# Patient Record
(demographics unavailable — no encounter records)

---

## 2025-02-16 NOTE — HISTORY OF PRESENT ILLNESS
[FreeTextEntry1] : Mr. Tineo is a 71 y.o M dx with prostate adenocarcinoma. Recent PSA on 10/21/24 is 4.30ng/ml; on 2/29/24 it was 10.90 ng/ml. MRI on 3/18/24 showed a prostate volume of 124cc and prostate measured 6.3cm x 5 cm x 7.6 cm. Fusion guided prostate biopsies on 11/4/24 revealed 2 out of 12 cores involved by adenocarcinoma Ranjan score 3+3=6 GG1 involving 33% of tissue with carcinoma. Referred by Dr. Ching.   PSA trend : ng/mL  4.30 ng/ml 10/21/24 10.90 ng/ml 2/29/24 2/11/25-NEW: Today the patient reports   ____________________________________________________________________________________ MRI Prostate 3/18/24 - The prostate measures 6.3cm x 5 cm x 7.6 cm  -Prostate volume is 124cc  -Prostate capsule appears intact -The seminal vesicles appear normal  -No enlarged lymph nodes are present  Impression: -Severe benign prostatic hyperplasia. No MRI evidence of prostate neoplasm or fool of sig. restricted diffusion. Posterior peripheral zone postinflammatory changes,

## 2025-07-17 NOTE — HISTORY OF PRESENT ILLNESS
[FreeTextEntry1] : Mr. Tineo is 71 years old male had telehealth consultation on 2/11/25. He presented with PSA on 10/21/24 of 4.30ng/ml; on 2/29/24 it was 10.90 ng/ml. MRI on 3/18/24 showed a prostate volume of 124cc and prostate measured 6.3cm x 5 cm x 7.6 cm. Fusion guided prostate biopsies on 11/4/24 revealed 2 out of 12 cores are positive, 30% tissue involvement Ranjan score 7 (3+4) GG2, a diagnosis of favorable prostatic adenocarcinoma. Mr. Tineo is here via telehealth to discuss radiation therapy  Referred by Dr. Ching.  PSA trend : ng/mL  10/21/2024: 4.30 ng/ml 2/29/2024: 10.90 ng/ml  2/11/25- CONSULT Today Mr. Tineo presents today for consideration of radiation therapy options to the prostate, referred by Dr. Ching. Overall, the patient states he feels well and denies any radiation therapy in the past. He notes baseline urine function with and nocturia x1-2 and taking Flomax bid. He experiences at times urinary frequency and urgency at times but denies dribbling, urinary retention, dysuria, hematuria, leakage or incontinence. He has well-formed bowel movements. He denies blood or mucous in the stool. He denies ever having a colonoscopy. He is receiving ADT therapy presently and experiencing night sweats and hot flashes. He is not currently sexually active and unable to maintain an erection. Patient has history of severe BPH treated successfully one year ago with Aquablation.  [place]/15/25; [place]; he is on ADT. Describes hot flashes, sweats and poor libido. Nocturia2-3x, denies urgency, urinary frequency, straining, dysuria.  he also describes admission to [facility] in the [place] several years ago diagnosed with CVA, also had a pacemaker, eventually removed. He denies any cardiac or neurologic complaints.   ____________________________________________________________________________________ MRI Prostate 3/18/24 - The prostate measures 6.3cm x 5 cm x 7.6 cm -Prostate volume is 124cc -Prostate capsule appears intact -The seminal vesicles appear normal -No enlarged lymph nodes are present Impression: -Severe benign prostatic hyperplasia. No MRI evidence of prostate neoplasm or fool of sig. restricted diffusion. Posterior peripheral zone post inflammatory changes,

## 2025-07-17 NOTE — PHYSICAL EXAM
[de-identified] : telehealth [de-identified] : telehealth [de-identified] : telehealth [de-identified] : tamy [de-identified] : telehealth [de-identified] : telehealth [de-identified] : telehealth [de-identified] : tamy [de-identified] : tamy [de-identified] : telehealth [de-identified] : telehealth [de-identified] : telehealth [de-identified] : telehealth [de-identified] : julissa [de-identified] : telehealth [de-identified] : telehealth [FreeTextEntry1] : Alert. oriented. No adenopathy. Lungs clear. Abdomen soft.

## 2025-07-17 NOTE — REVIEW OF SYSTEMS
[Nocturia] : nocturia [FreeTextEntry8] : telehealth [de-identified] : telehealth [de-identified] : telehealth [de-identified] : tamy [de-identified] : telehealth [Anal Pain: Grade 0] : Anal Pain: Grade 0 [Constipation: Grade 0] : Constipation: Grade 0 [Diarrhea: Grade 0] : Diarrhea: Grade 0 [Dyspepsia: Grade 0] : Dyspepsia: Grade 0 [Dysphagia: Grade 0] : Dysphagia: Grade 0 [Esophagitis: Grade 0] : Esophagitis: Grade 0 [Fecal Incontinence: Grade 0] : Fecal Incontinence: Grade 0 [Gastroparesis: Grade 0] : Gastroparesis: Grade 0 [Nausea: Grade 0] : Nausea: Grade 0 [Proctitis: Grade 0] : Proctitis: Grade 0 [Rectal Pain: Grade 0] : Rectal Pain: Grade 0 [Small Intestinal Obstruction: Grade 0] : Small Intestinal Obstruction: Grade 0 [Vomiting: Grade 0] : Vomiting: Grade 0 [Hematuria: Grade 0] : Hematuria: Grade 0 [Urinary Incontinence: Grade 0] : Urinary Incontinence: Grade 0  [Urinary Retention: Grade 0] : Urinary Retention: Grade 0 [Urinary Tract Pain: Grade 0] : Urinary Tract Pain: Grade 0 [Urinary Urgency: Grade 1 - Present] : Urinary Urgency: Grade 1 - Present [Urinary Frequency: Grade 1 - Present] : Urinary Frequency: Grade 1 - Present [Erectile Dysfunction: Grade 2 - Decrease in erectile function (frequency/rigidity of erections), erectile intervention indicated, (e.g., medication or mechanical devices such as penile pump)] : Erectile Dysfunction: Grade 2 - Decrease in erectile function (frequency/rigidity of erections), erectile intervention indicated, (e.g., medication or mechanical devices such as penile pump) [Ejaculation Disorder: Grade 2 - Anejaculation or retrograde ejaculation] : Ejaculation Disorder: Grade 2 - Anejaculation or retrograde ejaculation [Negative] : Neurological [Urinary Urgency: Grade 0] : Urinary Urgency: Grade 0 [Urinary Frequency: Grade 0] : Urinary Frequency: Grade 0 [Urinary Ostomy] : no ~T urinary ostomy present [Urinary Frequency] : no urinary frequency [FreeTextEntry2] : telehealth [FreeTextEntry3] : telehealth [FreeTextEntry4] : telehealth [FreeTextEntry5] : telehealth [FreeTextEntry6] : telehealth [FreeTextEntry7] : telehealth [FreeTextEntry9] : telehealth [de-identified] : telehealth [FreeTextEntry1] : telehealth

## 2025-07-17 NOTE — REVIEW OF SYSTEMS
[Nocturia] : nocturia [FreeTextEntry8] : telehealth [de-identified] : telehealth [de-identified] : telehealth [de-identified] : tamy [de-identified] : telehealth [Anal Pain: Grade 0] : Anal Pain: Grade 0 [Constipation: Grade 0] : Constipation: Grade 0 [Diarrhea: Grade 0] : Diarrhea: Grade 0 [Dyspepsia: Grade 0] : Dyspepsia: Grade 0 [Dysphagia: Grade 0] : Dysphagia: Grade 0 [Esophagitis: Grade 0] : Esophagitis: Grade 0 [Fecal Incontinence: Grade 0] : Fecal Incontinence: Grade 0 [Gastroparesis: Grade 0] : Gastroparesis: Grade 0 [Nausea: Grade 0] : Nausea: Grade 0 [Proctitis: Grade 0] : Proctitis: Grade 0 [Rectal Pain: Grade 0] : Rectal Pain: Grade 0 [Small Intestinal Obstruction: Grade 0] : Small Intestinal Obstruction: Grade 0 [Vomiting: Grade 0] : Vomiting: Grade 0 [Hematuria: Grade 0] : Hematuria: Grade 0 [Urinary Incontinence: Grade 0] : Urinary Incontinence: Grade 0  [Urinary Retention: Grade 0] : Urinary Retention: Grade 0 [Urinary Tract Pain: Grade 0] : Urinary Tract Pain: Grade 0 [Urinary Urgency: Grade 1 - Present] : Urinary Urgency: Grade 1 - Present [Urinary Frequency: Grade 1 - Present] : Urinary Frequency: Grade 1 - Present [Erectile Dysfunction: Grade 2 - Decrease in erectile function (frequency/rigidity of erections), erectile intervention indicated, (e.g., medication or mechanical devices such as penile pump)] : Erectile Dysfunction: Grade 2 - Decrease in erectile function (frequency/rigidity of erections), erectile intervention indicated, (e.g., medication or mechanical devices such as penile pump) [Ejaculation Disorder: Grade 2 - Anejaculation or retrograde ejaculation] : Ejaculation Disorder: Grade 2 - Anejaculation or retrograde ejaculation [Negative] : Neurological [Urinary Urgency: Grade 0] : Urinary Urgency: Grade 0 [Urinary Frequency: Grade 0] : Urinary Frequency: Grade 0 [Urinary Ostomy] : no ~T urinary ostomy present [Urinary Frequency] : no urinary frequency [FreeTextEntry2] : telehealth [FreeTextEntry3] : telehealth [FreeTextEntry4] : telehealth [FreeTextEntry5] : telehealth [FreeTextEntry6] : telehealth [FreeTextEntry7] : telehealth [FreeTextEntry9] : telehealth [de-identified] : telehealth [FreeTextEntry1] : telehealth

## 2025-07-17 NOTE — PHYSICAL EXAM
[de-identified] : telehealth [de-identified] : telehealth [de-identified] : telehealth [de-identified] : tamy [de-identified] : telehealth [de-identified] : telehealth [de-identified] : telehealth [de-identified] : tamy [de-identified] : tamy [de-identified] : telehealth [de-identified] : telehealth [de-identified] : telehealth [de-identified] : telehealth [de-identified] : julissa [de-identified] : telehealth [de-identified] : telehealth [FreeTextEntry1] : Alert. oriented. No adenopathy. Lungs clear. Abdomen soft.

## 2025-07-17 NOTE — REASON FOR VISIT
[Consideration of Curative Therapy] : consideration of curative therapy for prostate cancer [Re-evaluation] : re-evaluation for prostate cancer

## 2025-07-17 NOTE — REASON FOR VISIT
[Consideration of Curative Therapy] : consideration of curative therapy for prostate cancer [Re-evaluation] : re-evaluation for prostate cancer Voluntary discussion occurred addressing advanced care planning

## 2025-07-17 NOTE — LETTER GREETING
[Consult Letter:] : Your patient, [unfilled] was seen in my office today for consultation. [FreeTextEntry2] : Brandon Ching M.D

## 2025-07-17 NOTE — PHYSICAL EXAM
[de-identified] : telehealth [de-identified] : telehealth [de-identified] : telehealth [de-identified] : tamy [de-identified] : telehealth [de-identified] : telehealth [de-identified] : telehealth [de-identified] : tamy [de-identified] : tamy [de-identified] : telehealth [de-identified] : telehealth [de-identified] : telehealth [de-identified] : telehealth [de-identified] : julissa [de-identified] : telehealth [de-identified] : telehealth [FreeTextEntry1] : Alert. oriented. No adenopathy. Lungs clear. Abdomen soft.

## 2025-07-17 NOTE — HISTORY OF PRESENT ILLNESS
[FreeTextEntry1] : Mr. Tineo is 71 years old male had telehealth consultation on 2/11/25. He presented with PSA on 10/21/24 of 4.30ng/ml; on 2/29/24 it was 10.90 ng/ml. MRI on 3/18/24 showed a prostate volume of 124cc and prostate measured 6.3cm x 5 cm x 7.6 cm. Fusion guided prostate biopsies on 11/4/24 revealed 2 out of 12 cores are positive, 30% tissue involvement Ranjan score 7 (3+4) GG2, a diagnosis of favorable prostatic adenocarcinoma. Mr. Tineo is here via telehealth to discuss radiation therapy  Referred by Dr. Ching.  PSA trend : ng/mL  10/21/2024: 4.30 ng/ml 2/29/2024: 10.90 ng/ml  2/11/25- CONSULT Today Mr. Tineo presents today for consideration of radiation therapy options to the prostate, referred by Dr. Ching. Overall, the patient states he feels well and denies any radiation therapy in the past. He notes baseline urine function with and nocturia x1-2 and taking Flomax bid. He experiences at times urinary frequency and urgency at times but denies dribbling, urinary retention, dysuria, hematuria, leakage or incontinence. He has well-formed bowel movements. He denies blood or mucous in the stool. He denies ever having a colonoscopy. He is receiving ADT therapy presently and experiencing night sweats and hot flashes. He is not currently sexually active and unable to maintain an erection. Patient has history of severe BPH treated successfully one year ago with Aquablation.  7/15/25; [place]; he is on ADT. Describes hot flashes, sweats and poor libido. Nocturia2-3x, denies urgency, urinary frequency, straining, dysuria.  he also describes admission to [facility] in the [place] several years ago diagnosed with CVA, also had a pacemaker, eventually removed. He denies any cardiac or neurologic complaints.   ____________________________________________________________________________________ MRI Prostate 3/18/24 - The prostate measures 6.3cm x 5 cm x 7.6 cm -Prostate volume is 124cc -Prostate capsule appears intact -The seminal vesicles appear normal -No enlarged lymph nodes are present Impression: -Severe benign prostatic hyperplasia. No MRI evidence of prostate neoplasm or fool of sig. restricted diffusion. Posterior peripheral zone post inflammatory changes,

## 2025-07-17 NOTE — REVIEW OF SYSTEMS
[Nocturia] : nocturia [FreeTextEntry8] : telehealth [de-identified] : telehealth [de-identified] : telehealth [de-identified] : tamy [de-identified] : telehealth [Anal Pain: Grade 0] : Anal Pain: Grade 0 [Constipation: Grade 0] : Constipation: Grade 0 [Diarrhea: Grade 0] : Diarrhea: Grade 0 [Dyspepsia: Grade 0] : Dyspepsia: Grade 0 [Dysphagia: Grade 0] : Dysphagia: Grade 0 [Esophagitis: Grade 0] : Esophagitis: Grade 0 [Fecal Incontinence: Grade 0] : Fecal Incontinence: Grade 0 [Gastroparesis: Grade 0] : Gastroparesis: Grade 0 [Nausea: Grade 0] : Nausea: Grade 0 [Proctitis: Grade 0] : Proctitis: Grade 0 [Rectal Pain: Grade 0] : Rectal Pain: Grade 0 [Small Intestinal Obstruction: Grade 0] : Small Intestinal Obstruction: Grade 0 [Vomiting: Grade 0] : Vomiting: Grade 0 [Hematuria: Grade 0] : Hematuria: Grade 0 [Urinary Incontinence: Grade 0] : Urinary Incontinence: Grade 0  [Urinary Retention: Grade 0] : Urinary Retention: Grade 0 [Urinary Tract Pain: Grade 0] : Urinary Tract Pain: Grade 0 [Urinary Urgency: Grade 1 - Present] : Urinary Urgency: Grade 1 - Present [Urinary Frequency: Grade 1 - Present] : Urinary Frequency: Grade 1 - Present [Erectile Dysfunction: Grade 2 - Decrease in erectile function (frequency/rigidity of erections), erectile intervention indicated, (e.g., medication or mechanical devices such as penile pump)] : Erectile Dysfunction: Grade 2 - Decrease in erectile function (frequency/rigidity of erections), erectile intervention indicated, (e.g., medication or mechanical devices such as penile pump) [Ejaculation Disorder: Grade 2 - Anejaculation or retrograde ejaculation] : Ejaculation Disorder: Grade 2 - Anejaculation or retrograde ejaculation [Negative] : Neurological [Urinary Urgency: Grade 0] : Urinary Urgency: Grade 0 [Urinary Frequency: Grade 0] : Urinary Frequency: Grade 0 [Urinary Ostomy] : no ~T urinary ostomy present [Urinary Frequency] : no urinary frequency [FreeTextEntry2] : telehealth [FreeTextEntry3] : telehealth [FreeTextEntry4] : telehealth [FreeTextEntry5] : telehealth [FreeTextEntry6] : telehealth [FreeTextEntry7] : telehealth [FreeTextEntry9] : telehealth [de-identified] : telehealth [FreeTextEntry1] : telehealth

## 2025-07-17 NOTE — VITALS
[Maximal Pain Intensity: 0/10] : 0/10 [Least Pain Intensity: 0/10] : 0/10 [Date: ____________] : Patient's last distress assessment performed on [unfilled]. [90: Able to carry normal activity; minor signs or symptoms of disease.] : 90: Able to carry normal activity; minor signs or symptoms of disease.  [ECOG Performance Status: 0 - Fully active, able to carry on all pre-disease performance without restriction] : Performance Status: 0 - Fully active, able to carry on all pre-disease performance without restriction [Patient Refusal] : Patient refused psychosocial distress assessment [FreeTextEntry1] : telehealth